# Patient Record
(demographics unavailable — no encounter records)

---

## 2024-11-22 NOTE — HISTORY OF PRESENT ILLNESS
[FreeTextEntry1] : This 2-1/2-year-old healthy child with normal development is seen for evaluation of the left elbow.  She was well until 3 days ago when she fell from the couch at home sustaining injury.  She was placed into a long-arm cast at [facility] with a concern for fracture of the elbow.  She is still having discomfort.  Past history is negative

## 2024-11-22 NOTE — PHYSICAL EXAM
[FreeTextEntry1] : Exam today reveals this child immobilized in a bivalved long-arm cast that is a little bit tight there is some swelling to the fingers though she is moving more well neurovascular status is intact.  I did remove the covering soft tissues and spread the cast apart she felt significantly better right away  Review of x-rays [facility] left elbow and forearm multiple views November 19, 2024 reveal effusion to the elbow only

## 2024-11-22 NOTE — ASSESSMENT
[FreeTextEntry1] : Impression: Contusion left elbow rule out occult fracture.  She will continue with the long-arm cast I discussed cast care and activities the child will return in 2 and half weeks with x-rays of the left elbow and probable removal of the cast at that time

## 2024-12-09 NOTE — PHYSICAL EXAM
[FreeTextEntry1] : Examination today the cast fits well she is moving her fingers well no swelling neurovascular status is intact no foul smell.  X-rays ordered and taken today of the left elbow were negative

## 2024-12-09 NOTE — HISTORY OF PRESENT ILLNESS
[FreeTextEntry1] : At this time alert returns for follow-up of her left elbow.  The child's been very comfortable in her cast

## 2024-12-09 NOTE — ASSESSMENT
[FreeTextEntry1] : Impression: Contusion of left elbow.  The cast has been removed there is no local tenderness.  She will begin motion exercises to her tolerance no playground on the order 7-10 days return as needed